# Patient Record
Sex: FEMALE | Race: WHITE | NOT HISPANIC OR LATINO | Employment: OTHER | URBAN - METROPOLITAN AREA
[De-identification: names, ages, dates, MRNs, and addresses within clinical notes are randomized per-mention and may not be internally consistent; named-entity substitution may affect disease eponyms.]

---

## 2024-09-25 PROBLEM — Z01.419 WELL WOMAN EXAM WITH ROUTINE GYNECOLOGICAL EXAM: Status: ACTIVE | Noted: 2024-09-25

## 2024-09-25 NOTE — ASSESSMENT & PLAN NOTE
Pap is not indicated.  Mammogram is ordered.   She is told she does not need colonoscopy.  Regular exercise and attaining/maintaining a healthy weight is encouraged.   Adequate calcium intake with diet or supplements is encouraged.    We will notify of any abnormal results.

## 2024-09-25 NOTE — PROGRESS NOTES
Subjective   Patient ID: Nabila Griffith is a 68 y.o. female who presents for Vaginal Itching.  She presents for gyn visit. She would like annual exam today along with evaluation of concerns. Most recent gyn visit with our practice was on 2/9/2022. She is s/p hysterectomy. Mammogram is found in the EMR from 3/20/2023. She had reported past normal bone density on DEXA but results are unavailable.     At last visit she was willing to reduce estrogen replacement therapy and Estradiol patch 0.0375 mg was prescribed. She stopped this a few months later. She notes hot flushes off this medication and this is associated with nausea. These occur 6-7 times a day. She is on Effexor with no relief in hot flushes. After discussion she desires to try clonidine patch for hot flushes.     She is using a personal lubricant for vaginal dryness. After discussion she desires to try vaginal estrogen for changes of atrophy.    She notes vulvar itching at the mons and on left side and the skin creases. She denies any discharge or odor. She used a cream in the distant past with improvement of this itching. The itching can be severe and is worse at night. She has damaged her skin with itching. She denies any concern for infection.          Review of Systems   Constitutional:  Negative for activity change.   HENT:  Negative for congestion.    Respiratory:  Negative for apnea and cough.    Cardiovascular:  Negative for chest pain.   Gastrointestinal:  Negative for constipation and diarrhea.   Genitourinary:  Negative for hematuria and vaginal pain.   Musculoskeletal:  Negative for joint swelling.   Neurological:  Negative for dizziness.   Psychiatric/Behavioral:  Negative for agitation.        Past Medical History:   Diagnosis Date    Personal history of other diseases of the musculoskeletal system and connective tissue     History of arthritis    Personal history of other mental and behavioral disorders 06/18/2020    History of anxiety       Past Surgical History:   Procedure Laterality Date    OTHER SURGICAL HISTORY  06/18/2020    Hysterectomy    OTHER SURGICAL HISTORY  06/18/2020    Abdominoplasty      Allergies   Allergen Reactions    Sulfa (Sulfonamide Antibiotics) Other    Sulfamethoxazole-Trimethoprim Rash    Terramycin Rash    Tetracycline Unknown, Rash and Swelling      Current Outpatient Medications on File Prior to Visit   Medication Sig Dispense Refill    Synthroid 75 mcg tablet       tretinoin (Retin-A) 0.025 % cream Apply a pea size amount to face twice weekly      b complex vitamins (Vitamins B Complex) capsule Take by mouth.      cholecalciferol (Vitamin D-3) 25 MCG (1000 UT) tablet Take by mouth.      multivitamin tablet Take 1 tablet by mouth once daily.      pantoprazole (ProtoNix) 40 mg EC tablet Take 1 tablet (40 mg) by mouth once daily.      venlafaxine XR (Effexor-XR) 75 mg 24 hr capsule Take 1 capsule (75 mg) by mouth once daily.       No current facility-administered medications on file prior to visit.        Objective   Physical Exam  Constitutional:       Appearance: Normal appearance.   Neck:      Thyroid: No thyromegaly.   Cardiovascular:      Rate and Rhythm: Normal rate and regular rhythm.      Heart sounds: Normal heart sounds.   Pulmonary:      Effort: Pulmonary effort is normal.      Breath sounds: Normal breath sounds.   Chest:      Chest wall: No mass.   Breasts:     Right: Normal. No inverted nipple, mass, nipple discharge or skin change.      Left: Normal. No inverted nipple, mass, nipple discharge or skin change.   Abdominal:      General: There is no distension.      Palpations: Abdomen is soft. There is no mass.      Tenderness: There is no abdominal tenderness.   Genitourinary:     General: Normal vulva.      Exam position: Lithotomy position.      Labia:         Right: No rash.         Left: No rash.       Urethra: No urethral lesion.      Vagina: Normal. No lesions.      Uterus: Absent.       Adnexa: Right  adnexa normal and left adnexa normal.        Right: No mass or tenderness.          Left: No mass or tenderness.            Comments: Anus and perineum are without lesion.  Bladder is without mass and non-tender.  Pale mucosa is  noted consistent with atrophy.  Thin skin with excoriations is noted at left vaginal opening and interlabial crease consistent with lichen sclerosis.   Musculoskeletal:         General: No deformity.      Cervical back: Neck supple.   Lymphadenopathy:      Cervical: No cervical adenopathy.   Skin:     General: Skin is warm and dry.      Findings: No rash.   Neurological:      General: No focal deficit present.      Mental Status: She is alert.   Psychiatric:         Mood and Affect: Mood normal.         Behavior: Behavior is cooperative.         Thought Content: Thought content normal.           Problem List Items Addressed This Visit       Well woman exam with routine gynecological exam - Primary    Overview     She is s/p hysterectomy.  DEXA in 2021 returned with normal bone density.           Current Assessment & Plan     Pap is not indicated.  Mammogram is ordered.   She is told she does not need colonoscopy.  Regular exercise and attaining/maintaining a healthy weight is encouraged.   Adequate calcium intake with diet or supplements is encouraged.    We will notify of any abnormal results.          Menopause syndrome    Overview     Hot flushes and night sweats off HRT. She desires a trial of clonidine patch.         Relevant Medications    cloNIDine (Catapres-TTS-1) 0.1 mg/24 hr patch    Lichen sclerosus of vulva    Overview     Severe labial itching with excoriation of skin and thinning noted of the epithelium.   Clobetasol ointment is prescribed with plans for slow wean.         Relevant Medications    triamcinolone (Kenalog) 0.1 % ointment    Atrophic vaginitis    Overview     Vaginal dryness and painful intercourse. Lubricant helps minimally.  Mucosa is noted to be thin and pale  on vaginal exam.   Estradiol vaginal cream is prescribed.          Relevant Medications    estradiol (Estrace) 0.01 % (0.1 mg/gram) vaginal cream     Other Visit Diagnoses       Encounter for screening mammogram for malignant neoplasm of breast        Relevant Orders    BI mammo bilateral screening tomosynthesis

## 2024-09-26 ENCOUNTER — APPOINTMENT (OUTPATIENT)
Dept: OBSTETRICS AND GYNECOLOGY | Facility: CLINIC | Age: 69
End: 2024-09-26
Payer: MEDICARE

## 2024-09-26 VITALS
BODY MASS INDEX: 24.73 KG/M2 | SYSTOLIC BLOOD PRESSURE: 138 MMHG | DIASTOLIC BLOOD PRESSURE: 80 MMHG | WEIGHT: 131 LBS | HEIGHT: 61 IN

## 2024-09-26 DIAGNOSIS — Z01.419 WELL WOMAN EXAM WITH ROUTINE GYNECOLOGICAL EXAM: Primary | ICD-10-CM

## 2024-09-26 DIAGNOSIS — Z12.31 ENCOUNTER FOR SCREENING MAMMOGRAM FOR MALIGNANT NEOPLASM OF BREAST: ICD-10-CM

## 2024-09-26 DIAGNOSIS — N95.1 MENOPAUSE SYNDROME: ICD-10-CM

## 2024-09-26 DIAGNOSIS — N95.2 ATROPHIC VAGINITIS: ICD-10-CM

## 2024-09-26 DIAGNOSIS — N90.4 LICHEN SCLEROSUS OF VULVA: ICD-10-CM

## 2024-09-26 PROCEDURE — 3008F BODY MASS INDEX DOCD: CPT | Performed by: OBSTETRICS & GYNECOLOGY

## 2024-09-26 PROCEDURE — G0101 CA SCREEN;PELVIC/BREAST EXAM: HCPCS | Performed by: OBSTETRICS & GYNECOLOGY

## 2024-09-26 PROCEDURE — 99214 OFFICE O/P EST MOD 30 MIN: CPT | Performed by: OBSTETRICS & GYNECOLOGY

## 2024-09-26 PROCEDURE — 1036F TOBACCO NON-USER: CPT | Performed by: OBSTETRICS & GYNECOLOGY

## 2024-09-26 PROCEDURE — 1160F RVW MEDS BY RX/DR IN RCRD: CPT | Performed by: OBSTETRICS & GYNECOLOGY

## 2024-09-26 PROCEDURE — 1159F MED LIST DOCD IN RCRD: CPT | Performed by: OBSTETRICS & GYNECOLOGY

## 2024-09-26 RX ORDER — CLONIDINE 0.1 MG/24H
PATCH, EXTENDED RELEASE TRANSDERMAL
Qty: 12 PATCH | Refills: 3 | Status: SHIPPED | OUTPATIENT
Start: 2024-09-26 | End: 2025-09-26

## 2024-09-26 RX ORDER — TRIAMCINOLONE ACETONIDE 1 MG/G
OINTMENT TOPICAL 2 TIMES DAILY
Qty: 30 G | Refills: 1 | Status: SHIPPED | OUTPATIENT
Start: 2024-09-26

## 2024-09-26 RX ORDER — LEVOTHYROXINE SODIUM 75 UG/1
TABLET ORAL
COMMUNITY
Start: 2021-09-27

## 2024-09-26 RX ORDER — CHOLECALCIFEROL (VITAMIN D3) 25 MCG
TABLET ORAL
COMMUNITY

## 2024-09-26 RX ORDER — PANTOPRAZOLE SODIUM 40 MG/1
40 TABLET, DELAYED RELEASE ORAL DAILY
COMMUNITY

## 2024-09-26 RX ORDER — VITAMIN B COMPLEX
CAPSULE ORAL
COMMUNITY

## 2024-09-26 RX ORDER — BISMUTH SUBSALICYLATE 262 MG
1 TABLET,CHEWABLE ORAL DAILY
COMMUNITY

## 2024-09-26 RX ORDER — TRETINOIN 0.25 MG/G
CREAM TOPICAL
COMMUNITY
Start: 2024-07-15 | End: 2026-09-12

## 2024-09-26 RX ORDER — VENLAFAXINE HYDROCHLORIDE 75 MG/1
75 CAPSULE, EXTENDED RELEASE ORAL DAILY
COMMUNITY

## 2024-09-26 RX ORDER — ESTRADIOL 0.1 MG/G
2 CREAM VAGINAL DAILY
Qty: 42.5 G | Refills: 12 | Status: SHIPPED | OUTPATIENT
Start: 2024-09-26 | End: 2025-09-26

## 2024-09-26 ASSESSMENT — ENCOUNTER SYMPTOMS
CONSTIPATION: 0
JOINT SWELLING: 0
COUGH: 0
APNEA: 0
ACTIVITY CHANGE: 0
HEMATURIA: 0
AGITATION: 0
DIARRHEA: 0
DIZZINESS: 0

## 2024-12-17 NOTE — PROGRESS NOTES
Subjective   Patient ID: Nabila Griffith is a 69 y.o. female who presents for Follow-up.  9/26/2024 documentation:  She presents for gyn visit. She would like annual exam today along with evaluation of concerns. Most recent gyn visit with our practice was on 2/9/2022. She is s/p hysterectomy. Mammogram is found in the EMR from 3/20/2023. She had reported past normal bone density on DEXA but results are unavailable.     At last visit she was willing to reduce estrogen replacement therapy and Estradiol patch 0.0375 mg was prescribed. She stopped this a few months later. She notes hot flushes off this medication and this is associated with nausea. These occur 6-7 times a day. She is on Effexor with no relief in hot flushes. After discussion she desires to try clonidine patch for hot flushes.     She is using a personal lubricant for vaginal dryness. After discussion she desires to try vaginal estrogen for changes of atrophy.    She notes vulvar itching at the mons and on left side and the skin creases. She denies any discharge or odor. She used a cream in the distant past with improvement of this itching. The itching can be severe and is worse at night. She has damaged her skin with itching. She denies any concern for infection.    12/19/2024:  At last visit exam was concerning for lichen sclerosis and clobetasol ointment was prescribed with plans for slow wean after symptoms improved.  She feels the rash is resolved. This was better after using the cream for one week. After stopping she had return of slight itching and she has continued to use the cream twice weekly as a preventive. We discussed the goal of stopping cream and resuming its use only if symptoms return.           Review of Systems   Constitutional:  Negative for activity change.   HENT:  Negative for congestion.    Respiratory:  Negative for apnea and cough.    Cardiovascular:  Negative for chest pain.   Gastrointestinal:  Negative for constipation and  diarrhea.   Genitourinary:  Negative for hematuria and vaginal pain.   Musculoskeletal:  Negative for joint swelling.   Neurological:  Negative for dizziness.   Psychiatric/Behavioral:  Negative for agitation.        Past Medical History:   Diagnosis Date    Personal history of other diseases of the musculoskeletal system and connective tissue     History of arthritis    Personal history of other mental and behavioral disorders 06/18/2020    History of anxiety      Past Surgical History:   Procedure Laterality Date    OTHER SURGICAL HISTORY  06/18/2020    Hysterectomy    OTHER SURGICAL HISTORY  06/18/2020    Abdominoplasty      Allergies   Allergen Reactions    Sulfa (Sulfonamide Antibiotics) Other    Sulfamethoxazole-Trimethoprim Rash    Terramycin Rash    Tetracycline Unknown, Rash and Swelling      Current Outpatient Medications on File Prior to Visit   Medication Sig Dispense Refill    b complex vitamins (Vitamins B Complex) capsule Take by mouth.      cholecalciferol (Vitamin D-3) 25 MCG (1000 UT) tablet Take by mouth.      cloNIDine (Catapres-TTS-1) 0.1 mg/24 hr patch Apply one patch on the skin and replace every 7 days, as directed 12 patch 3    estradiol (Estrace) 0.01 % (0.1 mg/gram) vaginal cream Insert 0.5 Applicatorfuls (2 g) into the vagina once daily. A portion of cream should be rubbed into labial skin. After two weeks reduce to using cream 2-3 times a week. 42.5 g 12    multivitamin tablet Take 1 tablet by mouth once daily.      pantoprazole (ProtoNix) 40 mg EC tablet Take 1 tablet (40 mg) by mouth once daily.      Synthroid 75 mcg tablet       tretinoin (Retin-A) 0.025 % cream Apply a pea size amount to face twice weekly      triamcinolone (Kenalog) 0.1 % ointment Apply topically 2 times a day. After symptoms resolve, slowly wean ointment over several weeks. 30 g 1    venlafaxine XR (Effexor-XR) 75 mg 24 hr capsule Take 1 capsule (75 mg) by mouth once daily.       No current facility-administered  medications on file prior to visit.        Objective   Physical Exam  Constitutional:       Appearance: Normal appearance.   Cardiovascular:      Rate and Rhythm: Normal rate and regular rhythm.   Pulmonary:      Effort: Pulmonary effort is normal.      Breath sounds: Normal breath sounds.   Abdominal:      Palpations: Abdomen is soft. There is no mass.      Tenderness: There is no abdominal tenderness.      Hernia: No hernia is present.   Genitourinary:     General: Normal vulva.      Exam position: Lithotomy position.      Labia:         Right: No lesion.         Left: No lesion.       Urethra: No urethral lesion.      Vagina: Normal. No lesions or prolapsed vaginal walls.      Uterus: Absent.       Comments: Vaginal cuff is intact.  Perineum and anus are without lesion.  Atrophy is noted, rash is resolved.  Skin:     General: Skin is warm and dry.   Neurological:      Mental Status: She is alert.           Problem List Items Addressed This Visit       Lichen sclerosus of vulva - Primary    Overview     Severe labial itching with excoriation of skin and thinning noted of the epithelium.   Clobetasol ointment resolved symptoms.         Current Assessment & Plan     She will stop with steroid ointment at this time. She agrees to resume the twice daily use with slow wean if symptoms return.

## 2024-12-19 ENCOUNTER — APPOINTMENT (OUTPATIENT)
Dept: OBSTETRICS AND GYNECOLOGY | Facility: CLINIC | Age: 69
End: 2024-12-19
Payer: MEDICARE

## 2024-12-19 VITALS — SYSTOLIC BLOOD PRESSURE: 148 MMHG | BODY MASS INDEX: 25.51 KG/M2 | WEIGHT: 135 LBS | DIASTOLIC BLOOD PRESSURE: 90 MMHG

## 2024-12-19 DIAGNOSIS — N90.4 LICHEN SCLEROSUS OF VULVA: Primary | ICD-10-CM

## 2024-12-19 PROCEDURE — 1036F TOBACCO NON-USER: CPT | Performed by: OBSTETRICS & GYNECOLOGY

## 2024-12-19 PROCEDURE — 1160F RVW MEDS BY RX/DR IN RCRD: CPT | Performed by: OBSTETRICS & GYNECOLOGY

## 2024-12-19 PROCEDURE — 1159F MED LIST DOCD IN RCRD: CPT | Performed by: OBSTETRICS & GYNECOLOGY

## 2024-12-19 PROCEDURE — 99213 OFFICE O/P EST LOW 20 MIN: CPT | Performed by: OBSTETRICS & GYNECOLOGY

## 2024-12-19 ASSESSMENT — ENCOUNTER SYMPTOMS
JOINT SWELLING: 0
APNEA: 0
HEMATURIA: 0
ACTIVITY CHANGE: 0
AGITATION: 0
CONSTIPATION: 0
DIZZINESS: 0
COUGH: 0
DIARRHEA: 0

## 2024-12-19 NOTE — ASSESSMENT & PLAN NOTE
She will stop with steroid ointment at this time. She agrees to resume the twice daily use with slow wean if symptoms return.

## 2025-03-11 ENCOUNTER — APPOINTMENT (OUTPATIENT)
Dept: OBSTETRICS AND GYNECOLOGY | Facility: CLINIC | Age: 70
End: 2025-03-11
Payer: MEDICARE

## 2025-03-13 ENCOUNTER — APPOINTMENT (OUTPATIENT)
Dept: OBSTETRICS AND GYNECOLOGY | Facility: CLINIC | Age: 70
End: 2025-03-13
Payer: MEDICARE

## 2025-08-22 ENCOUNTER — HOSPITAL ENCOUNTER (OUTPATIENT)
Dept: RADIOLOGY | Facility: CLINIC | Age: 70
Discharge: HOME | End: 2025-08-22
Payer: MEDICARE

## 2025-08-22 DIAGNOSIS — Z13.6 ENCOUNTER FOR SCREENING FOR CARDIOVASCULAR DISORDERS: ICD-10-CM

## 2025-08-22 PROCEDURE — 75571 CT HRT W/O DYE W/CA TEST: CPT

## 2026-01-14 ENCOUNTER — APPOINTMENT (OUTPATIENT)
Dept: OBSTETRICS AND GYNECOLOGY | Facility: CLINIC | Age: 71
End: 2026-01-14
Payer: MEDICARE